# Patient Record
(demographics unavailable — no encounter records)

---

## 2024-11-05 NOTE — ASSESSMENT
[FreeTextEntry1] : spirometry is performed to assess the patient for progress/ evaluation  of baseline asthma (per national asthma management guidelines) result: normal / low FEV/FVC may be physiological variation exhaled nitrous oxide is performed to assess allergy/ inflammation  result:   55      (   normal <20, 20-35 likely TH2 driven inflammation >35 significant Th2   driven inflammation ) d/w guardian above results continue to monitor progress continue treatment plan  most of the cough is c/w PND type but some EIBS, baseline increase  increase the Pulmicort 2 puff 2x/day  teach to use MDI without spacer correct technique for pulmicort  return in 2 months  update school form as independent user  40 minutes spent >50% in coordination of care/ counselling

## 2024-11-05 NOTE — PHYSICAL EXAM
[Well Nourished] : well nourished [Well Developed] : well developed [Alert] : ~L alert [Active] : active [Normal Breathing Pattern] : normal breathing pattern [No Respiratory Distress] : no respiratory distress [No Allergic Shiners] : no allergic shiners [No Drainage] : no drainage [No Conjunctivitis] : no conjunctivitis [Tympanic Membranes Clear] : tympanic membranes were clear [Nasal Mucosa Non-Edematous] : nasal mucosa non-edematous [No Nasal Drainage] : no nasal drainage [No Polyps] : no polyps [No Sinus Tenderness] : no sinus tenderness [No Oral Pallor] : no oral pallor [No Oral Cyanosis] : no oral cyanosis [Non-Erythematous] : non-erythematous [No Exudates] : no exudates [No Postnasal Drip] : no postnasal drip [Tonsil Size ___] : tonsil size [unfilled] [No Tonsillar Enlargement] : no tonsillar enlargement [Absence Of Retractions] : absence of retractions [Symmetric] : symmetric [Good Expansion] : good expansion [No Acc Muscle Use] : no accessory muscle use [Good aeration to bases] : good aeration to bases [Equal Breath Sounds] : equal breath sounds bilaterally [No Crackles] : no crackles [No Rhonchi] : no rhonchi [No Wheezing] : no wheezing [Normal Sinus Rhythm] : normal sinus rhythm [No Heart Murmur] : no heart murmur [Soft, Non-Tender] : soft, non-tender [No Hepatosplenomegaly] : no hepatosplenomegaly [Non Distended] : was not ~L distended [Abdomen Mass (___ Cm)] : no abdominal mass palpated [Full ROM] : full range of motion [No Clubbing] : no clubbing [Capillary Refill < 2 secs] : capillary refill less than two seconds [No Cyanosis] : no cyanosis [No Petechiae] : no petechiae [No Kyphoscoliosis] : no kyphoscoliosis [No Contractures] : no contractures [Alert and  Oriented] : alert and oriented [No Abnormal Focal Findings] : no abnormal focal findings [Normal Muscle Tone And Reflexes] : normal muscle tone and reflexes [No Birth Marks] : no birth marks [No Rashes] : no rashes [No Skin Lesions] : no skin lesions [FreeTextEntry1] : clearing the throat less this viist [FreeTextEntry5] : mallampati 3/4

## 2024-11-05 NOTE — HISTORY OF PRESENT ILLNESS
[FreeTextEntry1] : was  seen by ENT suspect reflux put on pantoprazole about the same but missed doses   still having  clearing throat kind of cough in the morning and in the evening still involved in sports want to run 1.5 kilo and 5 kilo events sometimes a little more easily OOB he is using pulmicort flexhaler sometimes did not get a click  LAST VISIT coughing is about the same mostly it is clearing of the throat  CXR was done 3 weeks ago and pending   both the father and the child agree he was having a few cough after waking up, clearing throat mainly, the rest is mostly random  he was seen by Dr Olmedo: in the past for allergy  doing well, running , he just had apacer test  able to run to 31 times between two lines, better than friends close to top performers in the class

## 2025-04-03 NOTE — PHYSICAL EXAM
[Alert] : alert [Well Nourished] : well nourished [Healthy Appearance] : healthy appearance [No Acute Distress] : no acute distress [Well Developed] : well developed [Normal Pupil & Iris Size/Symmetry] : normal pupil and iris size and symmetry [No Discharge] : no discharge [No Photophobia] : no photophobia [Sclera Not Icteric] : sclera not icteric [Normal TMs] : both tympanic membranes were normal [Normal Nasal Mucosa] : the nasal mucosa was normal [Normal Lips/Tongue] : the lips and tongue were normal [Normal Outer Ear/Nose] : the ears and nose were normal in appearance [Normal Tonsils] : normal tonsils [No Thrush] : no thrush [Pale mucosa] : pale mucosa [Supple] : the neck was supple [Normal Rate and Effort] : normal respiratory rhythm and effort [No Crackles] : no crackles [No Retractions] : no retractions [Bilateral Audible Breath Sounds] : bilateral audible breath sounds [Wheezing] : wheezing was heard [Normal Rate] : heart rate was normal  [Normal S1, S2] : normal S1 and S2 [No murmur] : no murmur [Regular Rhythm] : with a regular rhythm [Skin Intact] : skin intact  [No Rash] : no rash [No Skin Lesions] : no skin lesions [Normal Mood] : mood was normal [Normal Affect] : affect was normal [Alert, Awake, Oriented as Age-Appropriate] : alert, awake, oriented as age appropriate

## 2025-04-03 NOTE — ASSESSMENT
[FreeTextEntry1] : 1. AR/AC - Cetirizine 10mg, Fluticasone nasal.   2. AS - pulmicort and albuterol prn per pulmonology

## 2025-04-03 NOTE — HISTORY OF PRESENT ILLNESS
[None] : The patient is currently asymptomatic [de-identified] : RENITA GREENBERG is a 12-year-old male who has year-round seasonal allergies when he was younger, he had allergy testing done it said he was allergic to multiple environmentals, he had followed up with an allergist back in 2020, but no longer needed to because he had no symptoms. For the past two months he has had a cough that has worsened over time, they followed up with pediatrician, he was prescribed Albuterol that is given as needed, for this past week the albuterol has not helped relief his symptoms, he is now wheezing and cough after running around, they give him Cetirizine every day, mom has noticed when he does not take Cetirizine his cough starts to flare up. Mom suspects he has developed asthma , he is here today to know how to better treat his symptoms.   Mom states this morning he developed a cough and stuffed nose, mom states it's pretty consistent but more noticeable today. Mom states he has been taking budesonide in the mornings since seeing Dr. Merritt and is keeping his cough seemingly under control, but Dr. Merritt believes allergies as the underlying cause is probably allergies as his spirometry testing are coming back good. Mom is here to see if he can have further evaluation.